# Patient Record
Sex: MALE | Race: WHITE | NOT HISPANIC OR LATINO
[De-identification: names, ages, dates, MRNs, and addresses within clinical notes are randomized per-mention and may not be internally consistent; named-entity substitution may affect disease eponyms.]

---

## 2020-08-20 ENCOUNTER — APPOINTMENT (OUTPATIENT)
Dept: ORTHOPEDIC SURGERY | Facility: CLINIC | Age: 50
End: 2020-08-20
Payer: COMMERCIAL

## 2020-08-20 VITALS
HEART RATE: 87 BPM | TEMPERATURE: 97.5 F | BODY MASS INDEX: 28.79 KG/M2 | HEIGHT: 68 IN | SYSTOLIC BLOOD PRESSURE: 125 MMHG | DIASTOLIC BLOOD PRESSURE: 89 MMHG | WEIGHT: 190 LBS

## 2020-08-20 DIAGNOSIS — M77.11 LATERAL EPICONDYLITIS, RIGHT ELBOW: ICD-10-CM

## 2020-08-20 DIAGNOSIS — M77.12 LATERAL EPICONDYLITIS, RIGHT ELBOW: ICD-10-CM

## 2020-08-20 PROCEDURE — 99203 OFFICE O/P NEW LOW 30 MIN: CPT

## 2020-08-20 PROCEDURE — 73070 X-RAY EXAM OF ELBOW: CPT | Mod: RT

## 2020-08-21 DIAGNOSIS — Z78.9 OTHER SPECIFIED HEALTH STATUS: ICD-10-CM

## 2020-08-26 NOTE — CONSULT LETTER
[Dear  ___] : Dear  [unfilled], [Consult Letter:] : I had the pleasure of evaluating your patient, [unfilled]. [Please see my note below.] : Please see my note below. [Sincerely,] : Sincerely, [Consult Closing:] : Thank you very much for allowing me to participate in the care of this patient.  If you have any questions, please do not hesitate to contact me. [FreeTextEntry3] : Torey Parks III, MD \par IVANA/radha

## 2020-08-26 NOTE — HISTORY OF PRESENT ILLNESS
[2] : the ailment interference is 2/10 [(Does not interfere) 0] : the ailment interference is 0/10 (does not interfere) [de-identified] : Corky comes in today with complaints of pain to his right elbow.  He states he bought a mobile home and was doing a lot of waxing on it and working on it over the last several months.  He has pain over the lateral aspect of his elbow.  Occasionally he had numbness, but he states that has gone away.  The patient states the onset/injury occurred 2/10/20.  This injury is not due to an automobile accident.  The patient describes the pain as dull.

## 2020-08-26 NOTE — DISCUSSION/SUMMARY
[de-identified] : The patient presents with tennis elbow, right.  At this time I recommend a tennis elbow strap, ice, topical anti-inflammatory cream and be reassessed in three or four weeks for a possible injection.

## 2020-08-26 NOTE — ADDENDUM
[FreeTextEntry1] : This note was written by Lakeshia Rosenberg on 08/25/2020 acting as scribe for Torey Parks III, MD

## 2020-08-26 NOTE — PHYSICAL EXAM
[de-identified] : Left elbow:\par Elbow: Range of Motion in Degrees\par 	                                          Claimant:	Normal:	\par Flexion (Active)	                          170	170	\par Flexion (Passive)	                          170	170	\par Extension(Active)	                           0	                 0	\par Extension (Passive)	           0	                 0	\par Pronation/Supination (Active)	           0-180	 0-180	\par Pronation/Supination (Passive)          0-180             0-180	\par \par No tenderness to palpation over the medial and lateral epicondyle.  No pain with resisted dorsi or palmar flexion with .  No tenderness over the distal biceps.  No tenderness over the olecranon.  No swelling over the olecranon.  No instability to varus or valgus stress at 0, 30, 60 and 90 degrees.  No instability in the AP plane.  No motor or sensory deficits.  2+ radial and ulnar pulses.  Skin is intact.  No rashes, scars or lesions. \par \par Right elbow:\par Elbow: Range of Motion in Degrees:\par 	                                               Claimant:	        Normal:	\par Flexion (Active)	                                 170	          170	\par Flexion (Passive)	                                 170	          170	\par Extension(Active)	                                   0	            0	\par Extension (Passive)	                   0	            0	\par Pronation/Supination (Active)	                0-180	          0-180	\par Pronation/Supination (Passive)	0-180	          0-180	\par             \par Point tenderness over the lateral epicondyle.  No tenderness to palpation over the medial epicondyle.  Pain with resisted dorsi and palmar flexion with .  No tenderness over the distal biceps.  No tenderness over the olecranon.  No swelling over the olecranon.  No instability to varus or valgus stress at 0, 30, 60 and 90 degrees.  No instability in the AP plane.  No motor or sensory deficits.   2+ radial and ulnar pulses.  Skin is intact.  No rashes, scars or lesions.  [de-identified] : Gait: normal    Station: normal  [de-identified] : Appearance: Well-developed, well-nourished male  in no acute distress.  [de-identified] : Radiographs, two views of the right elbow, show no obvious osseous abnormality.